# Patient Record
Sex: MALE | Race: BLACK OR AFRICAN AMERICAN | NOT HISPANIC OR LATINO | Employment: UNEMPLOYED | URBAN - METROPOLITAN AREA
[De-identification: names, ages, dates, MRNs, and addresses within clinical notes are randomized per-mention and may not be internally consistent; named-entity substitution may affect disease eponyms.]

---

## 2020-07-24 NOTE — TELEPHONE ENCOUNTER
MEDICAL RECORDS REQUEST   [facility]  Urology Clinic  [address] [place] [address]  PHONE: [phone]  Fax: [phone]        FUTURE VISIT INFORMATION                                                   Thu Maria : 2000 scheduled for future visit at [facility]    APPOINTMENT INFORMATION:    Date: 20 2PM    Provider:  Caren Portillo PA-C    Reason for Visit/Diagnosis: Hematuria     REFERRAL INFORMATION:    Referring provider:  Self    Specialty: N/A    Referring providers clinic:  N/A    Clinic contact number:  N/A    RECORDS REQUESTED FOR VISIT                                                     NOTES  STATUS/DETAILS   OFFICE NOTE from referring provider  in process   OFFICE NOTE from other specialist  in process   DISCHARGE SUMMARY from hospital  in process   DISCHARGE REPORT from the ER  in process   OPERATIVE REPORT  in process   MEDICATION LIST  in process   LABS     URINALYSIS (UA)  in process     PRE-VISIT CHECKLIST      Record collection complete No- CSS called and spoke with pt's Mom, records from [facility] will be faxed. -  @[facility]   Appointment appropriately scheduled           (right time/right provider) Yes   MyChart activation If no, please explain: In process    Questionnaire complete If no, please explain: In process      Action Daisy 20 8:22AM   Action Taken CSS re-emailed pt's instructions on where to fax recs, CSS called and spoke with Mom [phone] - [facility] cell for pt. CSS updated demographics and called pt and LM for pt to call back.      Action Daisy 20 1:07PM    Action Taken CSS called spoke with pt, per pt he will call to have his recs faxed - 2nd attempt      Action Daisy 8/10/20 1:34PM   Action Taken CSS called and LM for pt to have recs faxed ASAP - 3rd and last attempt - Clinical staff aware recs are not here for this pt      Completed by: Daisy OLMEDO  Khoa

## 2020-07-30 ENCOUNTER — PRE VISIT (OUTPATIENT)
Dept: UROLOGY | Facility: CLINIC | Age: 20
End: 2020-07-30

## 2020-07-30 NOTE — TELEPHONE ENCOUNTER
Visit Type : hematuria     Records/Orders: sent message about records     Pt Contacted: no    At Rooming: phone

## 2020-08-11 ENCOUNTER — VIRTUAL VISIT (OUTPATIENT)
Dept: UROLOGY | Facility: CLINIC | Age: 20
End: 2020-08-11
Payer: COMMERCIAL

## 2020-08-11 ENCOUNTER — PRE VISIT (OUTPATIENT)
Dept: UROLOGY | Facility: CLINIC | Age: 20
End: 2020-08-11

## 2020-08-11 DIAGNOSIS — Z87.898 HISTORY OF GROSS HEMATURIA: Primary | ICD-10-CM

## 2020-08-11 DIAGNOSIS — Z87.898 HISTORY OF DYSURIA: ICD-10-CM

## 2020-08-11 ASSESSMENT — PAIN SCALES - GENERAL: PAINLEVEL: NO PAIN (0)

## 2020-08-11 NOTE — PATIENT INSTRUCTIONS
UROLOGY CLINIC VISIT PATIENT INSTRUCTIONS    Follow up for lab appointment and CT scan.  I will contact you with results.     If you have any issues, questions or concerns in the meantime, do not hesitate to contact us at [phone] or via [employer].     It was a pleasure meeting with you today.  Thank you for allowing me and my team the privilege of caring for you today.  YOU are the reason we are here, and I truly hope we provided you with the excellent service you deserve.  Please let us know if there is anything else we can do for you so that we can be sure you are leaving completely satisfied with your care experience.

## 2020-08-11 NOTE — PROGRESS NOTES
"Thu Maria is a 20 year old male who is being evaluated via a billable telephone visit.      The patient has been notified of following:     \"This telephone visit will be conducted via a call between you and your physician/provider. We have found that certain health care needs can be provided without the need for a physical exam.  This service lets us provide the care you need with a short phone conversation.  If a prescription is necessary we can send it directly to your pharmacy.  If lab work is needed we can place an order for that and you can then stop by our lab to have the test done at a later time.    Telephone visits are billed at different rates depending on your insurance coverage. During this emergency period, for some insurers they may be billed the same as an in-person visit.  Please reach out to your insurance provider with any questions.    If during the course of the call the physician/provider feels a telephone visit is not appropriate, you will not be charged for this service.\"    Patient has given verbal consent for Telephone visit?  Yes    What phone number would you like to be contacted at? [phone]    How would you like to obtain your AVS? Mail a copy    CC: gross hematuria.    HPI: Mr. Thu Maria is a 20 year old male who is being evaluated via billable telephone visit for gross hematuria. About 3-4 weeks ago, he started \"peeing blood out of the blue.\" This was associated with significant dysuria - he relates that it felt like something was \"scraping his urethra.\" The hematuria occurred 2-3 times that day and then resolved. He denies any associated urinary frequency, urgency, incontinence, hesitancy, intermittency, needing to strain to void, sensation of incomplete bladder emptying, fevers, chills, N/V, penile discharge, or pyuria. Feels that his stream might be slightly weak on occasion at baseline. No concern for STI's. He was seen through his local [facility] on 7/22 where " "urine and blood tests \"checked out\" per his report. No additional testing was recommended.    He has not had any further gross hematuria or dysuria since then, though reports a similar history that occurred 1 year ago. He \"peed blood' for less than a day while at [employer] which eventually resolved on it's own.    He wonders if he may be passing kidney stones though denies seeing a stone pass and denies history of kidney stones. He relates that his father has a history of kidney failure related to diabetes but no history of kidney stones that he is aware or.     No past medical history on file.  No past surgical history on file.  No current outpatient medications on file.     No Known Allergies     FAMILY HISTORY:   Father with a history of kidney failure related to DM.  No family history on file.    SOCIAL HISTORY:   Social History     Tobacco Use     Smoking status: Not on file   Substance Use Topics     Alcohol use: Not on file     Drug use: Not on file       ROS: A comprehensive 14 point ROS was obtained and was negative except for that outlined above in the HPI.    LABS: urine and blood tests normal per patient report; no records available to review    IMAGING: none    ASSESSMENT and PLAN:    Mr. Thu Maria is a 20 year old male with acute onset gross hematuria associated with dysuria. Has occurred twice now, 1 year apart. Both times were self-limited and resolved in less than 24 hours. He denies concern for STI's. Had blood and urine testing through [facility] on 7/22 - no records available to review but patient reports labs were normal.     Possible differentials may include infection, urethritis, prostatitis, urolithiasis, urothelial malignancy, renal disorder, vs other. Recommend additional workup to include:  -UA/UC, cytology  -CT A/P non-contrast stone protocol.  -Possible cystoscopy if the above are unremarkable.     Additional recommendations pending results of the above.      Phone call " duration: 12 minutes    Caren Portillo PA-C

## 2020-08-11 NOTE — LETTER
"8/11/2020       RE: Thu Maria  [address] [facility] [address]     Dear Colleague,    Thank you for referring your patient, Thu Maria, to the [facility] UROLOGY AND INST FOR PROSTATE AND UROLOGIC CANCERS at [facility]. Please see a copy of my visit note below.    Thu Maria is a 20 year old male who is being evaluated via a billable telephone visit.      The patient has been notified of following:     \"This telephone visit will be conducted via a call between you and your physician/provider. We have found that certain health care needs can be provided without the need for a physical exam.  This service lets us provide the care you need with a short phone conversation.  If a prescription is necessary we can send it directly to your pharmacy.  If lab work is needed we can place an order for that and you can then stop by our lab to have the test done at a later time.    Telephone visits are billed at different rates depending on your insurance coverage. During this emergency period, for some insurers they may be billed the same as an in-person visit.  Please reach out to your insurance provider with any questions.    If during the course of the call the physician/provider feels a telephone visit is not appropriate, you will not be charged for this service.\"    Patient has given verbal consent for Telephone visit?  Yes    What phone number would you like to be contacted at? [phone]    How would you like to obtain your AVS? Mail a copy    CC: gross hematuria.    HPI: Mr. Thu Maria is a 20 year old male who is being evaluated via billable telephone visit for gross hematuria. About 3-4 weeks ago, he started \"peeing blood out of the blue.\" This was associated with significant dysuria - he relates that it felt like something was \"scraping his urethra.\" The hematuria occurred 2-3 times that day and then resolved. He denies any associated urinary frequency, " "urgency, incontinence, hesitancy, intermittency, needing to strain to void, sensation of incomplete bladder emptying, fevers, chills, N/V, penile discharge, or pyuria. Feels that his stream might be slightly weak on occasion at baseline. No concern for STI's. He was seen through his local [facility] on 7/22 where urine and blood tests \"checked out\" per his report. No additional testing was recommended.    He has not had any further gross hematuria or dysuria since then, though reports a similar history that occurred 1 year ago. He \"peed blood' for less than a day while at [employer] which eventually resolved on it's own.    He wonders if he may be passing kidney stones though denies seeing a stone pass and denies history of kidney stones. He relates that his father has a history of kidney failure related to diabetes but no history of kidney stones that he is aware or.     No past medical history on file.  No past surgical history on file.  No current outpatient medications on file.     No Known Allergies     FAMILY HISTORY:   Father with a history of kidney failure related to DM.  No family history on file.    SOCIAL HISTORY:   Social History     Tobacco Use     Smoking status: Not on file   Substance Use Topics     Alcohol use: Not on file     Drug use: Not on file       ROS: A comprehensive 14 point ROS was obtained and was negative except for that outlined above in the HPI.    LABS: urine and blood tests normal per patient report; no records available to review    IMAGING: none    ASSESSMENT and PLAN:    Mr. Thu Maria is a 20 year old male with acute onset gross hematuria associated with dysuria. Has occurred twice now, 1 year apart. Both times were self-limited and resolved in less than 24 hours. He denies concern for STI's. Had blood and urine testing through [facility] on 7/22 - no records available to review but patient reports labs were normal.     Possible differentials may include " infection, urethritis, prostatitis, urolithiasis, urothelial malignancy, renal disorder, vs other. Recommend additional workup to include:  -UA/UC, cytology  -CT A/P non-contrast stone protocol.  -Possible cystoscopy if the above are unremarkable.     Additional recommendations pending results of the above.      Phone call duration: 12 minutes    Caren Portillo PA-C

## 2020-09-29 ENCOUNTER — TELEPHONE (OUTPATIENT)
Dept: UROLOGY | Facility: CLINIC | Age: 20
End: 2020-09-29

## 2020-09-29 NOTE — TELEPHONE ENCOUNTER
[facility] Call Center    Phone Message    May a detailed message be left on voicemail: yes     Reason for Call: Other: David calling to schedule her son Thu an appointment to be seen for blood in his urine and pain with urination. Thu saw Caren Portillo on 8/11/20 for a virtual visit regarding these symptoms, and David says they have returned. David noted that the blood in Thu's urine is more this time. Please give David a call back at your ealriest convenience to discuss.     Action Taken: Message routed to:  [facility]    Travel Screening: Not Applicable

## 2020-09-29 NOTE — TELEPHONE ENCOUNTER
Herb Melo-Patient's mother David was notified that the patient needs to have UA/UC, cytology  -CT A/P non-contrast stone protocol.  -Possible cystoscopy if the above are unremarkable. She agreed with the plan. Message sent to the clinic coordinators schedulers as well to coordinate his lab and radiology appointment.     Thanks, Jac Serra MA

## 2020-10-01 ENCOUNTER — ANCILLARY PROCEDURE (OUTPATIENT)
Dept: CT IMAGING | Facility: CLINIC | Age: 20
End: 2020-10-01
Attending: PHYSICIAN ASSISTANT
Payer: COMMERCIAL

## 2020-10-01 DIAGNOSIS — Z87.898 HISTORY OF GROSS HEMATURIA: ICD-10-CM

## 2020-10-01 LAB
ALBUMIN UR-MCNC: NEGATIVE MG/DL
APPEARANCE UR: CLEAR
BILIRUB UR QL STRIP: NEGATIVE
COLOR UR AUTO: YELLOW
GLUCOSE UR STRIP-MCNC: NEGATIVE MG/DL
HGB UR QL STRIP: NEGATIVE
KETONES UR STRIP-MCNC: NEGATIVE MG/DL
LEUKOCYTE ESTERASE UR QL STRIP: NEGATIVE
NITRATE UR QL: NEGATIVE
PH UR STRIP: 5 PH (ref 5–7)
SOURCE: NORMAL
SP GR UR STRIP: 1.02 (ref 1–1.03)
UROBILINOGEN UR STRIP-MCNC: 0 MG/DL (ref 0–2)

## 2020-10-01 PROCEDURE — 74176 CT ABD & PELVIS W/O CONTRAST: CPT | Mod: GC | Performed by: RADIOLOGY

## 2020-10-01 PROCEDURE — 81003 URINALYSIS AUTO W/O SCOPE: CPT | Performed by: PATHOLOGY

## 2020-10-01 PROCEDURE — 99000 SPECIMEN HANDLING OFFICE-LAB: CPT | Performed by: PATHOLOGY

## 2020-10-01 PROCEDURE — 87086 URINE CULTURE/COLONY COUNT: CPT | Mod: 90 | Performed by: PATHOLOGY

## 2020-10-02 ENCOUNTER — TELEPHONE (OUTPATIENT)
Dept: UROLOGY | Facility: CLINIC | Age: 20
End: 2020-10-02

## 2020-10-02 LAB
BACTERIA SPEC CULT: NORMAL
COPATH REPORT: NORMAL
Lab: NORMAL
SPECIMEN SOURCE: NORMAL

## 2020-10-02 NOTE — TELEPHONE ENCOUNTER
Attempted to call patient with normal CT scan and normal urinalysis results.     Left voice mail asking patient to return call to urology clinic.    If patient returns call, nursing triage can inform patient that his CT scan is normal - no kidney stones or other abnormality to explain the blood in his urine. His urinalysis is also normal without blood or infection.    Given the recurrent blood in his urine without explanation, would recommend that he be scheduled for cystoscopy with next available urologist.     Caren Portillo PA-C  Department of Urology

## 2020-10-07 NOTE — TELEPHONE ENCOUNTER
Patient called and told the results from imaging and labs were all normal Emiliana Castanon LPN Staff Nurse

## 2020-10-08 NOTE — TELEPHONE ENCOUNTER
----- Message from Caren Portillo PA-C sent at 10/6/2020  3:16 PM CDT -----  Please contact patient to schedule cystoscopy with next available urologist for hematuria evaluation.

## 2020-10-20 ENCOUNTER — PRE VISIT (OUTPATIENT)
Dept: UROLOGY | Facility: CLINIC | Age: 20
End: 2020-10-20

## 2020-10-20 NOTE — TELEPHONE ENCOUNTER
Reason for visit: cystoscopy      Relevant information: hematuria work up     Records/imaging/labs/orders: labs and imaging available    Pt called: no need for a call    At Rooming: standard

## 2020-12-04 ENCOUNTER — PRE VISIT (OUTPATIENT)
Dept: UROLOGY | Facility: CLINIC | Age: 20
End: 2020-12-04

## 2020-12-04 NOTE — TELEPHONE ENCOUNTER
Visit Type : Cystoscopy     Hx/Sx: Hematuria    Records/Orders: Available    Pt Contacted: n/a    At Rooming: Obtain urine sample

## 2020-12-09 ENCOUNTER — OFFICE VISIT (OUTPATIENT)
Dept: UROLOGY | Facility: CLINIC | Age: 20
End: 2020-12-09
Payer: COMMERCIAL

## 2020-12-09 VITALS
HEART RATE: 99 BPM | DIASTOLIC BLOOD PRESSURE: 84 MMHG | SYSTOLIC BLOOD PRESSURE: 128 MMHG | WEIGHT: 265 LBS | HEIGHT: 71 IN | BODY MASS INDEX: 37.1 KG/M2

## 2020-12-09 DIAGNOSIS — Z87.898 HISTORY OF GROSS HEMATURIA: Primary | ICD-10-CM

## 2020-12-09 PROCEDURE — 52000 CYSTOURETHROSCOPY: CPT | Performed by: UROLOGY

## 2020-12-09 RX ORDER — LIDOCAINE HYDROCHLORIDE 20 MG/ML
JELLY TOPICAL ONCE
Status: ACTIVE | OUTPATIENT
Start: 2020-12-09

## 2020-12-09 ASSESSMENT — MIFFLIN-ST. JEOR: SCORE: 2224.16

## 2020-12-09 ASSESSMENT — PAIN SCALES - GENERAL: PAINLEVEL: NO PAIN (0)

## 2020-12-09 NOTE — NURSING NOTE
"Chief Complaint   Patient presents with     Cyst       Blood pressure 128/84, pulse 99, height 1.803 m (5' 11\"), weight 120.2 kg (265 lb). Body mass index is 36.96 kg/m .    There is no problem list on file for this patient.      No Known Allergies    No current outpatient medications on file.       Social History     Tobacco Use     Smoking status: Not on file   Substance Use Topics     Alcohol use: Not on file     Drug use: Not on file       Invasive Procedure Safety Checklist:    Procedure: Cystoscopy    Action: Complete sections and checkboxes as appropriate.    Pre-procedure:  1. Patient ID Verified with 2 identifiers (Francisca and  or MRN) : YES    2. Procedure and site verified with patient/designee (when able) : YES    3. Accurate consent documentation in medical record : YES    4. H&P (or appropriate assessment) documented in medical record : N/A  H&P must be up to 30 days prior to procedure an updated within 24 hours of                 Procedure as applicable.     5. Relevant diagnostic and radiology test results appropriately labeled and displayed as applicable : YES    6. Blood products, implants, devices, and/or special equipment available for the procedure as applicable : YES    7. Procedure site(s) marked with provider initials [Exclusions: none] : NO    8. Marking not required. Reason : Yes  Procedure does not require site marking    Time Out:     Time-Out performed immediately prior to starting procedure, including verbal and active participation of all team members addressing: YES    1. Correct patient identity.  2. Confirmed that the correct side and site are marked.  3. An accurate procedure to be done.  4. Agreement on the procedure to be done.  5. Correct patient position.  6. Relevant images and results are properly labeled and appropriately displayed.  7. The need to administer antibiotics or fluids for irrigation purposes during the procedure as applicable.  8. Safety precautions based on " patient history or medication use.    During Procedure: Verification of correct person, site, and procedure occurs any time the responsibility for care of the patient is transferred to another member of the care team.    The following medication was given:     MEDICATION: Lidocaine Uro-Jet 2% 200mg (20mg/mL)  ROUTE: Urethral   SITE: Urethra   DOSE: 10mL  LOT #: [id number]  : [employer]   EXPIRATION DATE: 8/22  NDC#: [address]-23   Was there drug waste? No    Prior to injection, verified patient identity using patient's name and date of birth.  Due to injection administration, patient instructed to remain in clinic for 15 minutes  afterwards, and to report any adverse reaction to me immediately.    Drug Amount Wasted:  None.  Vial/Syringe: Single dose vial      PORSCHE Stewart  12/9/2020  10:07 AM

## 2020-12-09 NOTE — PROGRESS NOTES
Cystoscopy Note    PRE-PROCEDURE DIAGNOSIS:  Gross Hematuria  POST-PROCEDURE DIAGNOSIS:  Gross Hematuria  PROCEDURE:   Cystoscopy    HISTORY: Thu Maria is a 20 year old male with intermittent terminal hematuria.  Urine studies and CT urogram were negative.  Does have very mild obstructive symptoms.    DESCRIPTION OF PROCEDURE:  After informed consent was obtained, the patient was brought to the procedure room where they were placed in the modified lithotomy position with all pressure points well padded.  The patient was prepped and draped in a sterile fashion. A flexible cystoscope was introduced through a well-lubricated urethra. There was some minor urethral stricture - 20 Fr in the bulbar urethra. There was some irritation and prominent vessel. The bladder was entered. The urine was clear. Systematic cystoscopy was performed. There were no tumors, stones, or other abnormalities in the bladder. The cystoscopy was somewhat limited as he was not tolerating it well so the scope was removed.    ASSESSMENT AND PLAN:  Very mild urethral stricture is suspicious for bleeding source.  However, his symptoms are mild.  I discussed conservative management given caliber of urethra and mild symptoms - though if symptoms worsen, we should consider urethroplasty or DVIU.  Will followup if needed    Akshat Merlos MD  Reconstructive Urology

## 2020-12-09 NOTE — LETTER
12/9/2020       RE: Thu Maria  [address] [facility] [address]     Dear Colleague,    Thank you for referring your patient, Thu Maria, to the [facility]OLOGY [facility]IC [place] [facility]. Please see a copy of my visit note below.    Cystoscopy Note    PRE-PROCEDURE DIAGNOSIS:  Gross Hematuria  POST-PROCEDURE DIAGNOSIS:  Gross Hematuria  PROCEDURE:   Cystoscopy    HISTORY: Thu Maria is a 20 year old male with intermittent terminal hematuria.  Urine studies and CT urogram were negative.  Does have very mild obstructive symptoms.    DESCRIPTION OF PROCEDURE:  After informed consent was obtained, the patient was brought to the procedure room where they were placed in the modified lithotomy position with all pressure points well padded.  The patient was prepped and draped in a sterile fashion. A flexible cystoscope was introduced through a well-lubricated urethra. There was some minor urethral stricture - 20 Fr in the bulbar urethra. There was some irritation and prominent vessel. The bladder was entered. The urine was clear. Systematic cystoscopy was performed. There were no tumors, stones, or other abnormalities in the bladder. The cystoscopy was somewhat limited as he was not tolerating it well so the scope was removed.    ASSESSMENT AND PLAN:  Very mild urethral stricture is suspicious for bleeding source.  However, his symptoms are mild.  I discussed conservative management given caliber of urethra and mild symptoms - though if symptoms worsen, we should consider urethroplasty or DVIU.  Will followup if needed    Akshat Merlos MD  Reconstructive Urology

## 2024-02-20 ENCOUNTER — TELEPHONE (OUTPATIENT)
Dept: INTERNAL MEDICINE | Facility: CLINIC | Age: 24
End: 2024-02-20
Payer: COMMERCIAL

## 2024-02-22 ENCOUNTER — OFFICE VISIT (OUTPATIENT)
Dept: INTERNAL MEDICINE | Facility: CLINIC | Age: 24
End: 2024-02-22
Payer: COMMERCIAL

## 2024-02-22 ENCOUNTER — LAB (OUTPATIENT)
Dept: LAB | Facility: CLINIC | Age: 24
End: 2024-02-22
Payer: COMMERCIAL

## 2024-02-22 VITALS
BODY MASS INDEX: 34.16 KG/M2 | HEART RATE: 71 BPM | SYSTOLIC BLOOD PRESSURE: 145 MMHG | HEIGHT: 72 IN | WEIGHT: 252.2 LBS | DIASTOLIC BLOOD PRESSURE: 78 MMHG | OXYGEN SATURATION: 95 %

## 2024-02-22 DIAGNOSIS — Z11.4 SCREENING FOR HIV (HUMAN IMMUNODEFICIENCY VIRUS): Primary | ICD-10-CM

## 2024-02-22 DIAGNOSIS — Z11.59 NEED FOR HEPATITIS C SCREENING TEST: ICD-10-CM

## 2024-02-22 DIAGNOSIS — R03.0 ELEVATED BLOOD PRESSURE READING WITHOUT DIAGNOSIS OF HYPERTENSION: ICD-10-CM

## 2024-02-22 DIAGNOSIS — Z00.00 PREVENTATIVE HEALTH CARE: ICD-10-CM

## 2024-02-22 DIAGNOSIS — F90.9 ATTENTION DEFICIT HYPERACTIVITY DISORDER (ADHD), UNSPECIFIED ADHD TYPE: ICD-10-CM

## 2024-02-22 DIAGNOSIS — Z11.4 SCREENING FOR HIV (HUMAN IMMUNODEFICIENCY VIRUS): ICD-10-CM

## 2024-02-22 LAB
ANION GAP SERPL CALCULATED.3IONS-SCNC: 13 MMOL/L (ref 7–15)
BUN SERPL-MCNC: 8.1 MG/DL (ref 6–20)
C TRACH DNA SPEC QL NAA+PROBE: NEGATIVE
CALCIUM SERPL-MCNC: 9.6 MG/DL (ref 8.6–10)
CHLORIDE SERPL-SCNC: 104 MMOL/L (ref 98–107)
CHOLEST SERPL-MCNC: 142 MG/DL
CREAT SERPL-MCNC: 0.92 MG/DL (ref 0.67–1.17)
DEPRECATED HCO3 PLAS-SCNC: 26 MMOL/L (ref 22–29)
EGFRCR SERPLBLD CKD-EPI 2021: >90 ML/MIN/1.73M2
FASTING STATUS PATIENT QL REPORTED: YES
GLUCOSE SERPL-MCNC: 87 MG/DL (ref 70–99)
HBV CORE AB SERPL QL IA: NONREACTIVE
HBV SURFACE AB SERPL IA-ACNC: <3.5 M[IU]/ML
HBV SURFACE AB SERPL IA-ACNC: NONREACTIVE M[IU]/ML
HBV SURFACE AG SERPL QL IA: NONREACTIVE
HCV AB SERPL QL IA: NONREACTIVE
HDLC SERPL-MCNC: 42 MG/DL
HIV 1+2 AB+HIV1 P24 AG SERPL QL IA: NONREACTIVE
LDLC SERPL CALC-MCNC: 87 MG/DL
N GONORRHOEA DNA SPEC QL NAA+PROBE: NEGATIVE
NONHDLC SERPL-MCNC: 100 MG/DL
POTASSIUM SERPL-SCNC: 4.4 MMOL/L (ref 3.4–5.3)
SODIUM SERPL-SCNC: 143 MMOL/L (ref 135–145)
T PALLIDUM AB SER QL: NONREACTIVE
TRIGL SERPL-MCNC: 65 MG/DL

## 2024-02-22 PROCEDURE — 87389 HIV-1 AG W/HIV-1&-2 AB AG IA: CPT | Performed by: INTERNAL MEDICINE

## 2024-02-22 PROCEDURE — 87340 HEPATITIS B SURFACE AG IA: CPT | Performed by: INTERNAL MEDICINE

## 2024-02-22 PROCEDURE — 36415 COLL VENOUS BLD VENIPUNCTURE: CPT | Performed by: PATHOLOGY

## 2024-02-22 PROCEDURE — 87591 N.GONORRHOEAE DNA AMP PROB: CPT | Performed by: INTERNAL MEDICINE

## 2024-02-22 PROCEDURE — 87491 CHLMYD TRACH DNA AMP PROBE: CPT | Performed by: INTERNAL MEDICINE

## 2024-02-22 PROCEDURE — 86780 TREPONEMA PALLIDUM: CPT | Performed by: INTERNAL MEDICINE

## 2024-02-22 PROCEDURE — 86704 HEP B CORE ANTIBODY TOTAL: CPT | Performed by: INTERNAL MEDICINE

## 2024-02-22 PROCEDURE — 99203 OFFICE O/P NEW LOW 30 MIN: CPT | Mod: GE

## 2024-02-22 PROCEDURE — 86803 HEPATITIS C AB TEST: CPT | Performed by: INTERNAL MEDICINE

## 2024-02-22 PROCEDURE — 99000 SPECIMEN HANDLING OFFICE-LAB: CPT | Performed by: PATHOLOGY

## 2024-02-22 PROCEDURE — 80061 LIPID PANEL: CPT | Performed by: PATHOLOGY

## 2024-02-22 PROCEDURE — 80048 BASIC METABOLIC PNL TOTAL CA: CPT | Performed by: PATHOLOGY

## 2024-02-22 PROCEDURE — 86706 HEP B SURFACE ANTIBODY: CPT | Performed by: INTERNAL MEDICINE

## 2024-02-22 NOTE — PROGRESS NOTES
Triple Blood Pressure   A triple blood pressure AHA was preformed wherein the blood pressure machine took Thu Maria's blood pressure over the course of ten minutes. The following were the individual blood pressures that were observed at today's visit:    (1) 127/85     (2) 141/84    (3) 145/83    The average blood pressure from today's individual readings were 138/84. The results of today's visit were communicated to the ordering provider.    Sruthi Marcelo, EMT at 1:23 PM on 2/22/2024

## 2024-02-22 NOTE — PROGRESS NOTES
Assessment & Plan     Mr. Maria is a 23-year-old man with no significant past medical history presented to the primary care clinic to establish care    Elevated blood pressure reading without diagnosis of hypertension  Elevated blood pressure today at 144/78.  With BMI 34.2.  Still elevated on repeat checks.  Will provide home blood pressure monitoring and check basic labs today.  Will follow-up in 1 month to discuss elevated blood pressure.  - Basic metabolic panel  (Ca, Cl, CO2, Creat, Gluc, K, Na, BUN); Future  - Home Blood Pressure Monitor Order for DME - ONLY FOR DME  - Lipid panel reflex to direct LDL Non-fasting; Future    Attention deficit hyperactivity disorder (ADHD), unspecified ADHD type  Patient previously had referral to psychiatry from his family medicine provider.  Has not been able to follow-up would like a referral within the [facility] system.  Patient notes concerns for adult onset ADHD, citing issues with concentration and completing tasks.  - Adult Mental Health  Referral; Future    Preventative health care  Patient is here to establish care.  Sexually active with multiple partners over the last year.  Will do a STD screening today.  Unclear if patient has received hepatitis B vaccines.  Will get hepatitis B titers today  - NEISSERIA GONORRHOEA PCR; Future  - CHLAMYDIA TRACHOMATIS PCR; Future  - Treponema Abs w Reflex to RPR and Titer; Future  - Hepatitis B Surface Antibody; Future  - Hepatitis B core antibody; Future  - Hepatitis B surface antigen; Future    Screening for HIV (human immunodeficiency virus)  - HIV Screening; Future    Need for hepatitis C screening test  - Hepatitis C Screen Reflex to HCV RNA Quant and Genotype; Future    Follow up:   - Repeat BP and initiate treatment of HTN if needed  - Hepatitis B vaccination  - Follow up ADHD concerns     No follow-ups on file.    Destiney Andino is a 23 year old, presenting for the following health issues:  Establish  Care      2024    12:15 PM   Additional Questions   Roomed by [facility]     History of Present Illness       Reason for visit:  Establish care    He eats 0-1 servings of fruits and vegetables daily.He consumes 1 sweetened beverage(s) daily.He exercises with enough effort to increase his heart rate 60 or more minutes per day.  He exercises with enough effort to increase his heart rate 3 or less days per week.   He is taking medications regularly.    No chronic illness.  No current prescription medications.  Only notes episode of hematuria in  that has now resolved.  Patient reports last seeing pediatrician when he was .     Surgery hx:   - wisdom tooth removal     Family Hx:   - Dad had T2DM   - Distant cousin  of colon cancer  - HTN   - No known cardiac issues     Social hx  - Working - graduated college in    - Sales support for work   - Marijuana use everyday - smoking for 3 years   - EtOH - Twice a week - 8 drinks that is typically beer   - Sexually active - multiple (4) partners within the last year  - Exercise 3/week strength traning with mild/mod cardio   - Eats red meat/grains - Will try to eat more vegetables      Review of Systems  Constitutional, HEENT, cardiovascular, pulmonary, gi and gu systems are negative, except as otherwise noted.      Objective    BP (!) 145/78 (BP Location: Right arm, Patient Position: Sitting, Cuff Size: Adult Large)   Pulse 71   Ht 1.829 m (6')   Wt 114.4 kg (252 lb 3.2 oz)   SpO2 95%   BMI 34.20 kg/m    Body mass index is 34.2 kg/m .  Physical Exam   GENERAL: alert and no distress  NECK: no adenopathy, no asymmetry, masses, or scars  RESP: lungs clear to auscultation - no rales, rhonchi or wheezes  CV: regular rate and rhythm, normal S1 S2, no S3 or S4, no murmur, click or rub, no peripheral edema  ABDOMEN: soft, nontender, no hepatosplenomegaly, no masses and bowel sounds normal  MS: no gross musculoskeletal defects noted, no edema    Orders Only on  10/01/2020   Component Date Value Ref Range Status    Copath Report 10/01/2020    Final                    Value:Patient Name: BRUCE MENDIETA  MR#: [id number]  Specimen #: [id number]  Collected: 10/1/2020  Received: 10/2/2020  Reported: 10/2/2020 16:07  Ordering Phy(s): TIM BLOCK    For improved result formatting, select 'View Enhanced Report Format' under   Linked Documents section.    SPECIMEN/STAIN PROCESS:  Urine, voided       Pap-Cyto x 1    ----------------------------------------------------------------    CYTOLOGIC INTERPRETATION:    Urine, voided:  - Negative for High-Grade Urothelial Carcinoma  - Specimen Adequacy: Satisfactory for evaluation but limited by scant   urothelial cells present.    I have personally reviewed all specimens and/or slides, including the   listed special stains, and used them  with my medical judgement to determine or confirm the final diagnosis.    Electronically signed out by:  Carlos Owusu M.D., [facility]    CLINICAL HISTORY:  History of 2-3 episodes of gross hematuria and significant dysuria   (7/2020).    ,    GROSS:  Urine, voided:  Receiv                          ed 50 ml of yellow, cloudy fluid, processed as 1 Pap   stained Autocyte..    MICROSCOPIC:  Microscopic examination is performed.  Diego العراقي MD PhD Pathology Resident, PGY2           Carlos Owusu III, MD   Attending    CPT Codes:  A: 60570-OALBZJA    COLLECTION SITE:  Client:  [facility]  Location:  [facility] (B)    The technical component of this testing was completed at the [facility], with the professional component performed   at the [facility], [address] [place] [id number] ([phone])    Resident  [id number]      Specimen Description 10/01/2020 Midstream Urine   Final    Special Requests 10/01/2020 Specimen received in preservative    Final    Culture Micro 10/01/2020    Final                    Value:10,000 to 50,000 colonies/mL  urogenital melly  Susceptibility testing not routinely done      Color Urine 10/01/2020 Yellow   Final    Appearance Urine 10/01/2020 Clear   Final    Glucose Urine 10/01/2020 Negative  NEG^Negative mg/dL Final    Bilirubin Urine 10/01/2020 Negative  NEG^Negative Final    Ketones Urine 10/01/2020 Negative  NEG^Negative mg/dL Final    Specific Gravity Urine 10/01/2020 1.020  1.003 - 1.035 Final    Blood Urine 10/01/2020 Negative  NEG^Negative Final    pH Urine 10/01/2020 5.0  5.0 - 7.0 pH Final    Protein Albumin Urine 10/01/2020 Negative  NEG^Negative mg/dL Final    Urobilinogen mg/dL 10/01/2020 0.0  0.0 - 2.0 mg/dL Final    Nitrite Urine 10/01/2020 Negative  NEG^Negative Final    Leukocyte Esterase Urine 10/01/2020 Negative  NEG^Negative Final    Source 10/01/2020 Midstream Urine   Final           Signed Electronically by: LIEN GRAF MD

## 2025-04-26 ENCOUNTER — HEALTH MAINTENANCE LETTER (OUTPATIENT)
Age: 25
End: 2025-04-26

## 2025-05-02 ENCOUNTER — OFFICE VISIT (OUTPATIENT)
Dept: INTERNAL MEDICINE | Facility: CLINIC | Age: 25
End: 2025-05-02
Payer: COMMERCIAL

## 2025-05-02 VITALS
SYSTOLIC BLOOD PRESSURE: 133 MMHG | OXYGEN SATURATION: 94 % | TEMPERATURE: 98 F | DIASTOLIC BLOOD PRESSURE: 84 MMHG | HEART RATE: 74 BPM | RESPIRATION RATE: 16 BRPM | WEIGHT: 266.1 LBS | BODY MASS INDEX: 36.09 KG/M2

## 2025-05-02 DIAGNOSIS — D50.9 MICROCYTIC ANEMIA: ICD-10-CM

## 2025-05-02 DIAGNOSIS — R10.12 ABDOMINAL PAIN, LEFT UPPER QUADRANT: Primary | ICD-10-CM

## 2025-05-02 PROCEDURE — 91320 SARSCV2 VAC 30MCG TRS-SUC IM: CPT | Performed by: INTERNAL MEDICINE

## 2025-05-02 PROCEDURE — 3075F SYST BP GE 130 - 139MM HG: CPT | Performed by: INTERNAL MEDICINE

## 2025-05-02 PROCEDURE — 3079F DIAST BP 80-89 MM HG: CPT | Performed by: INTERNAL MEDICINE

## 2025-05-02 PROCEDURE — 90480 ADMN SARSCOV2 VAC 1/ONLY CMP: CPT | Performed by: INTERNAL MEDICINE

## 2025-05-02 PROCEDURE — 99395 PREV VISIT EST AGE 18-39: CPT | Mod: 25 | Performed by: INTERNAL MEDICINE

## 2025-05-02 SDOH — HEALTH STABILITY: PHYSICAL HEALTH: ON AVERAGE, HOW MANY MINUTES DO YOU ENGAGE IN EXERCISE AT THIS LEVEL?: 50 MIN

## 2025-05-02 SDOH — HEALTH STABILITY: PHYSICAL HEALTH: ON AVERAGE, HOW MANY DAYS PER WEEK DO YOU ENGAGE IN MODERATE TO STRENUOUS EXERCISE (LIKE A BRISK WALK)?: 3 DAYS

## 2025-05-02 NOTE — PROGRESS NOTES
HPI  25-year-old presents today to establish primary care.  He is concerned about some left upper quadrant abdominal discomfort that he is experienced since the first of the year.  There is no definitive precipitating factor with this.  Its tends to be present predominantly in the morning it tends to be worse sometimes when he is sitting and riding in his car.  He recently had a trip to [place] where he is doing a little more drinking and feels that alcohol also tends to aggravate the situation coffee however it has no effect.  Eating seems to aggravate after period of time.  No nausea or vomiting is stools are variable can be loose intermittently and he has had on occasion a few nocturnal stools.  No bloody stools or black stools and most of the time has a tendency towards constipation.  He has not found any alleviating factors and he has not tried anything for this as it is more of a nuisance than it is an actual impediment or issue that interferes with his quality of life or his lifestyle.  He has gained a lot of weight recently and he admits that his diet is poor smokes marijuana on a daily basis does not use tobacco.  He works out regularly in the gym.  No past medical history on file.  No past surgical history on file.  Family History   Problem Relation Age of Onset    Diabetes Type 2  Father     Kidney failure Father          Exam:  /84 (BP Location: Right arm, Patient Position: Sitting, Cuff Size: Adult Large)   Pulse 74   Temp 98  F (36.7  C) (Oral)   Resp 16   Wt 120.7 kg (266 lb 1.6 oz)   SpO2 94%   BMI 36.09 kg/m    266 lbs 1.6 oz  Physical Exam   The patient is alert, oriented with a clear sensorium.   Skin shows no lesions or rashes and good turgor.   Head is normocephalic and atraumatic.   Eyes show PERRLA with benign optic fundi.   Ears show normal TMs bilaterally.   Mouth shows clear oral mucosa.   Neck shows no nodes, thyromegaly or bruits.   Back is non tender.  Lungs are clear to  percussion and auscultation.   Heart shows normal S1 and S2 without murmur or gallop.   Abdomen is obese soft and nontender without masses or organomegaly.   Genitalia show normal testes. No evidence of inguinal hernia.  Extremities show no edema and no evidence of active synovitis.   Neurologic examination shows cranial nerves II-XII intact. Motor shows 5/5 strength. Reflexes are symmetric. Cerebellar testing shows normal tandem gait.  Romberg negative.    Labs reviewed:  Results for orders placed or performed in visit on 05/02/25   Erythrocyte sedimentation rate auto     Status: Normal   Result Value Ref Range    Erythrocyte Sedimentation Rate 13 0 - 15 mm/hr   CRP inflammation     Status: Normal   Result Value Ref Range    CRP Inflammation 3.63 <5.00 mg/L   Comprehensive metabolic panel     Status: Normal   Result Value Ref Range    Sodium 139 135 - 145 mmol/L    Potassium 4.2 3.4 - 5.3 mmol/L    Carbon Dioxide (CO2) 27 22 - 29 mmol/L    Anion Gap 10 7 - 15 mmol/L    Urea Nitrogen 11.8 6.0 - 20.0 mg/dL    Creatinine 1.02 0.67 - 1.17 mg/dL    GFR Estimate >90 >60 mL/min/1.73m2    Calcium 9.8 8.8 - 10.4 mg/dL    Chloride 102 98 - 107 mmol/L    Glucose 87 70 - 99 mg/dL    Alkaline Phosphatase 79 40 - 150 U/L    AST 20 0 - 45 U/L    ALT 27 0 - 70 U/L    Protein Total 7.7 6.4 - 8.3 g/dL    Albumin 4.5 3.5 - 5.2 g/dL    Bilirubin Total 0.4 <=1.2 mg/dL   Lipase     Status: Normal   Result Value Ref Range    Lipase 24 13 - 60 U/L   TSH with free T4 reflex     Status: Normal   Result Value Ref Range    TSH 3.48 0.30 - 4.20 uIU/mL   CBC with platelets and differential     Status: Abnormal   Result Value Ref Range    WBC Count 4.4 4.0 - 11.0 10e3/uL    RBC Count 5.46 4.40 - 5.90 10e6/uL    Hemoglobin 13.1 (L) 13.3 - 17.7 g/dL    Hematocrit 41.7 40.0 - 53.0 %    MCV 76 (L) 78 - 100 fL    MCH 24.0 (L) 26.5 - 33.0 pg    MCHC 31.4 (L) 31.5 - 36.5 g/dL    RDW 15.2 (H) 10.0 - 15.0 %    Platelet Count 240 150 - 450 10e3/uL    %  Neutrophils 63 %    % Lymphocytes 30 %    % Monocytes 7 %    % Eosinophils 0 %    % Basophils 0 %    % Immature Granulocytes 0 %    NRBCs per 100 WBC 0 <1 /100    Absolute Neutrophils 2.8 1.6 - 8.3 10e3/uL    Absolute Lymphocytes 1.3 0.8 - 5.3 10e3/uL    Absolute Monocytes 0.3 0.0 - 1.3 10e3/uL    Absolute Eosinophils 0.0 0.0 - 0.7 10e3/uL    Absolute Basophils 0.0 0.0 - 0.2 10e3/uL    Absolute Immature Granulocytes 0.0 <=0.4 10e3/uL    Absolute NRBCs 0.0 10e3/uL   Iron & Iron Binding Capacity     Status: Normal   Result Value Ref Range    Iron 67 61 - 157 ug/dL    Iron Binding Capacity 347 240 - 430 ug/dL    Iron Sat Index 19 15 - 46 %   Ferritin     Status: Normal   Result Value Ref Range    Ferritin 396 31 - 409 ng/mL   CBC with Platelets & Differential     Status: Abnormal    Narrative    The following orders were created for panel order CBC with Platelets & Differential.  Procedure                               Abnormality         Status                     ---------                               -----------         ------                     CBC with platelets and ...[[id number]]  Abnormal            Final result                 Please view results for these tests on the individual orders.       ASSESSMENT  Abdominal pain possible gastritis  Hypertension borderline needs follow-up  Microcytic anemia uncertain etiology    Plan  We discussed nonpharmacologic blood pressure control measures and home blood pressure monitoring and he will send us the blood pressure reports in a month or 2.  Will check his labs today update his immunizations with a COVID booster and screen for Helicobacter pylori.  If that is negative we will treat empirically with a PPI to see if it helps with the symptoms.    This note was completed using Dragon voice recognition software.      Anival Vieyra MD  General Internal Medicine  [facility]  [phone]

## (undated) RX ORDER — LIDOCAINE HYDROCHLORIDE 20 MG/ML
JELLY TOPICAL
Status: DISPENSED
Start: 2020-12-09